# Patient Record
(demographics unavailable — no encounter records)

---

## 2021-06-30 NOTE — [facility]
EXAM: Lumbar spine, 5 views.



HISTORY: Pain.



COMPARISON: None.



FINDINGS: 5 views of the lumbar spine are obtained. There is lumbar dextroscoliosis centered at L3-L4
. There is mild retrolisthesis at multiple levels. There is multilevel endplate remodeling. There is 
facet arthropathy and disc space narrowing predominantly at L4-L5 and L5-S1. There is atherosclerotic
 plaque within the aorta and main aortic branch vessels. There is a suspected ectatic abdominal aorta
. There are cardiac pacemaker defibrillator leads of the superior margin of the field-of-view.



IMPRESSION: 

1. Multilevel degenerative change, primarily at the mid lower lumbar levels.

2. No acute osseous finding.



Electronically signed by: Jessica Byrd MD (6/30/2021 2:42 PM) [id number]

## 2021-07-09 NOTE — RAD
EXAMINATION: US ABDOMEN COMPLETE



INDICATION: 83 years, Female, abnormal liver function test.



COMPARISON: 7/2/2018



TECHNIQUE: Grayscale, color Doppler and limited spectral Doppler images of the abdomen were obtained.




FINDINGS: 



LIVER:

SIZE (LENGTH): 17.3 cm. 

ECHOGENICITY: Increased   

PARENCHYMA: Heterogeneous echotexture. No discrete focal lesion.

INTRAHEPATIC BILE DUCTS: Nondilated.     

PORTAL VEIN: Patent with normal hepatopedal flow.



GALLBLADDER:

GALLBLADDER WALL THICKNESS: 3 mm 

MORPHOLOGY: Normal morphology. No wall hyperemia or pericholecystic free fluid.   

LUMEN: Normal.   



COMMON BILE DUCT DIAMETER: 0.9 cm, similar to prior exam.



RIGHT KIDNEY:

MEASURES: 11.0 cm in length

MORPHOLOGY/PARENCHYMA: Normal corticomedullary differentiation with no shadowing calculus. There is a
 2.0 cm simple appearing cyst in the upper pole, unchanged since prior exam.   

COLLECTING SYSTEM: No hydronephrosis.   



LEFT KIDNEY:

MEASURES: 10.2 cm in length

MORPHOLOGY/PARENCHYMA: Normal corticomedullary differentiation with no shadowing calculus or discrete
 masses.   

COLLECTING SYSTEM: No hydronephrosis.



SPLEEN:

SIZE (LENGTH): 11.3 cm

PARENCHYMA: Unremarkable.   



PANCREAS: 

Obscured by overlying bowel gas. 



OTHER:

RETROPERITONEUM, INFERIOR VENA CAVA: Normal caliber.   

AORTA: Normal caliber measures up to 2.8 cm cm in the midsegment.

FLUID:No free fluid.    



IMPRESSION:

1. Mild to moderate diffuse hepatic steatosis.

2. Dilated proximal common bile duct measures up to 0.9 cm, unchanged since prior exam. No intrahepat
ic biliary ductal dilatation. Recommend further evaluation with total bilirubin level and may conside
r MRCP for evaluation of distal obstruction, as warranted.

3. Ectatic infrarenal abdominal aorta measures up to 2.8 cm, essentially unchanged since prior exam w
ith allowing differences in modality. Abdominal aortic aneurysm measuring 2.6-2.9 cm as above. Recomm
end follow-up ultrasound or CTA in 5 years per ACR and SVS recommendations.

4. Stable 2.0 cm simple right renal cyst.



Electronically signed by: Ayaz Gallegos MD (7/9/2021 8:59 AM) [id number]

## 2022-04-06 NOTE — PDOC1
INITIAL PAIN CONSULT


DATE OF SERVICE:


DOS:


DATE: 4/6/22 


TIME: 14:58





CHIEF COMPLAINT:


Chief Complaint:


Low back and right lower extremity pain





HISTORY OF PRESENT ILLNESS:


84-year-old male presents history of pain low back and right lower extremity for

about 2 years increasing with time not result of any specific injury or accident

that he is aware but getting worse over time cannot stand for very long more t

smith about 5 minutes patient reports is worse with standing and a still position 

such as at a desk or countertop patient reports is better with walking and 

moving around but still significantly painful after about 15 to 20 minutes he 

has to sit and rest with the walking patient reports in the low back rating the 

right lower extremity right foot leg in the back posterior gluteus posterior 

lateral thigh lateral anterior thigh anterior medial thigh and medial and 

posterior calf as well patient reports is constant sharp in the back 

intermittent intensity and radiating in the right leg with shooting cramping 

pain can be burning and hot and perception as well.  Patient reports a 

disability rating 0-10 10 being the worst is a 7 family home responsibilities 

and social activity 2 with self-care and life support activities specially 

sleeping.  Patient wears a CPAP machine at night as well which makes sleep even 

more difficult when he is uncomfortable has to change positions frequently.  

Patient reports it does affect his ability to walk significantly does not affect

his bowel bladder control however he usually goes to [facility] daily to walk is lo

ng as he can tolerate it and uses a shopping cart to lean on which does help.  

Patient does not use any other assistive devices canes or walkers normally.  

Patient has had physical therapy which he felt made the pain worse he is taking 

over-the-counter extra strength Tylenol as well as the melatonin to help him 

sleep which does help.  Patient reports no loss of motor function but s

ignificant fatigability the right lower extremity compared to the left.  Patient

reports no bowel or bladder incontinence.





PAST MEDICAL HISTORY:


PMH:


Hearing loss, arthritis, peripheral vascular disease, hypertension, ventricular 

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





PREVIOUS SURGERIES:


Past Surgical Hx:


Enucleation left eye, prostate surgery, coronary artery stent placements, 

pacemaker placement, defibrillator placement, watchman device placement





CURRENT MEDICATIONS:


Current Meds:





Active Scripts








 Medications  Dose


 Route/Sig


 Max Daily Dose Days Date Category


 


 Pennsaid


  (Diclofenac


 Sodium) 112 Gm


 Sol.md.  2 Alan


 TP BID PRN


  30 4/6/22 Reported


 


 Vitamin B-12


  (Cyanocobalamin


  (Vitamin B-12))


 500 Mcg Tablet  1 Tab


 PO DAILY


  30 4/6/22 Reported


 


 Acetaminophen 325


 Mg Tablet  1 Tab


 PO PRN DAILY PRN


  30 4/6/22 Reported


 


 Ocean (Sodium


 Chloride) 104 Ml


 Spray  104 Ml


 NS PRN PRN


   4/6/22 Reported


 


 Lyrica


  (Pregabalin) 50


 Mg Capsule  1 Cap


 PO BID


   4/6/22 Reported


 


 Namenda


  (Memantine Hcl)


 10 Mg Tablet  1 Tab


 PO BID


   4/6/22 Reported


 


 Aricept


  (Donepezil Hcl)


 10 Mg Tablet  1 Tab


 PO QHS


  30 4/6/22 Reported


 


 Diphenhydramine


 Hcl 25 Mg Tablet  25 Mg


 PO HS PRN


   4/6/22 Reported


 


 Questran Packet


  (Cholestyramine


  (With Sugar)) 4


 Gm Powd.pack  4 Gm


 PO PRN PRN


   4/6/22 Reported


 


 Zyrtec


  (Cetirizine Hcl)


 10 Mg Tablet  1 Tab


 PO DAILY


   4/6/22 Reported


 


 Atorvastatin


 Calcium 20 Mg


 Tablet  1 Tab


 PO DAILY


   4/6/22 Reported


 


 Toprol Xl


  (Metoprolol


 Succinate) 25 Mg


 Tab.er.24h  12.5 Mg


 PO DAILY


   4/6/22 Reported


 


 Eliquis


  (Apixaban) 5 Mg


 Tablet  5 Mg


 PO BID


   11/26/19 Reported


 


 Colace (Docusate


 Sodium) 100 Mg


 Capsule  1 Cap


 PO BID


  30 11/26/19 Reported


 


 Flonase Allergy


 Relief


  (Fluticasone


 Propionate) 9.9


 Ml Spray.susp  2 Sprays


 NS DAILY


   11/26/19 Reported


 


 Ferrous Sulfate


 325 Mg Tablet  1 Tab


 PO DAILY


   11/26/19 Reported


 


 Centrum Silver


 Chewable Tablet


  (Folic


 Acid/Multivits-Min/Lut)


 1 Each Tab.chew  1 Each


 PO ONCE


   11/26/19 Reported


 


 [melatonin]    10 Mg


 PO QHS


   5/24/18 Reported











ALLERGIES;


Allergies:  


Coded Allergies:  


     Milk Containing Products (Verified  Allergy, Intermediate, 11/25/19)


     Sulfa (Sulfonamide Antibiotics) (Verified  Allergy, Mild, 11/26/19)





FAMILY HISTORY:


Family Hx:


Heart disease, diabetes





SOCIAL HISTORY:


Social Hx:


He drinks alcohol about once a month, does not smoke says any illegal illicit or

 recreational drugs is  lives with his spouse and his stepdaughter 

patient is currently retired and lives locally in [facility]





REVIEW OF SYSTEMS:


ROS:


Positive for those items mentioned in history of present illness, all systems 

are reviewed, otherwise negative ,and are complete full and well-documented on 

patient's chart.





PHYSICAL EXAM:


VS:


Blood pressure is 147/76 pulse 88 respirations 16 temperature 97.6 F height is 

5 feet 6 inches weight is 155 pounds.


PE:


PHYSICAL EXAMINATION:





GENERAL: The patient is awake, alert, oriented, appropriate, very pleasant in 

demeanor


HEENT: Shows normocephalic, with prosthetic eye on the left.  Oral cavity: 

Mucous membranes moist and pink.  


NECK: Shows anterior throat supple without palpable lymphadenopathy noted.  

Swallow reflex symmetrical.


CHEST: Shows normal on inspection.  Breath sounds are clear bilaterally, distant

 and coarse but no rales rhonchi or wheezes auscultated bilaterally.


HEART: Shows S1, S2 clear.  No murmurs auscultated.


ABDOMEN: Soft, nontender, nondistended.  No palpable organomegaly is noted.


BACK: Shows spine grossly in the midline.  Normal-appearing cervical lordotic 

curvature.  There is mildly increased thoracic kyphosis, some mild flattening of

 the lumbar lordotic curvature.  Lumbar paraspinous muscles show symmetrical on 

inspection, on palpation shows some moderate tenderness diffusely throughout the

 upper, middle and lower distribution of the paraspinous muscles bilaterally and

 also into the lower thoracic paraspinous musculature, firm and tender, but 

without specific trigger points, without radiation of pain.  The patient has 

good rotational motion of the lumbar spine, both laterally as well as extension 

and flexion without significant difficulty.  No tenderness over the spinous 

processes, sacrum or sacroiliac regions.


EXTREMITIES: Lower extremities show deep tendon reflexes 1+ in the patellar and 

tendo calcaneus tendons.  Motor exam is positive on a scale of 5 with right 

dorsiflexion, extension, quadriceps and hamstring flexion and 5/5 on the left.  

Peripheral pulses are 1+ posterior tibial.  No peripheral edema is noted 

bilaterally.  Lower extremities are warm and dry to touch, equal in color and 

appearance.  Straight leg raise noted to be positive on the right about 40-45 

degrees, left side is negative.  Gaenslen's and Erick's maneuvers are negative

 bilaterally.  The patient is able to stand, has difficulty trying to stand on 

his toes as he loses balance putting all his weight on his right leg.  Patient 

is ambulating with a significant favoring gait favoring the right lower 

extremity not use any assistive devices on his visit today.


SKIN: Shows warm and dry, good turgor.  No edema.  No sores, rashes or bruising 

throughout.





IMPRESSION:


Impression:


84-year-old male with long history proximate 2 years increasing low back right 

lower extremity pain and radicular fashion.


Plain films lumbar spine with multilevel endplate remodeling facet arthropathy 

and disc space narrowing predominate L4-5 and L5-S1


Arthritis


Hypertension


Peripheral vascular disease


Diabetes


COPD





Plan: Options were discussed with the patient including conservative managements

 physical therapies and interventional techniques.  Patient would like to pursue

 interventional techniques.  We discussed a lumbar epidural steroid injection 

using description as well as anatomical models to describe the procedure.  We 

will first check with patient's cardiologist for clearance regarding patient's 

Eliquis and if deemed safe and appropriate to hold this for 3 days prior to 

return we will have him hold this and return for lumbar epidural steroid 

injection at that time.  In the meantime, patient will continue with stretching 

strength exercises as well as actively walking daily as tolerated.











FER HOPE MD                Apr 6, 2022 15:07

## 2022-04-12 NOTE — PDOC4
Procedure Note:


ICD 10 Code:


ICD 10 Code:


M54.16


M51.36


M48.06





Procedure Note:


Patient was consented for lumbar epidural steroid injection fluoroscopic 

guidance.  Risks were discussed including but not limited to: Bleeding, 

infection, possibility of epidural hematoma and subsequent neurological 

compromise, dural puncture, headaches, spinal cord and/or nerve damage, side 

effects of steroid medication, and poor results regarding pain control.  Patient

understands and wished to proceed.


Procedure is lumbar epidural steroid injection under local anesthetic using 

sterile prep and drape at the L4-5 level using C-arm fluoroscopic guidance in 

both AP and lateral views medications injected is 120 mg methylprednisolone 

+10mL preservative-free normal saline and 2 mL contrast- condition at discharge 

is stable patient tolerated procedure well had no complications.











FER HOPE MD               Apr 12, 2022 14:43

## 2022-04-12 NOTE — PDOC
Progress Note - Pain Clinic


Date of Service:


DOS:


DATE: 4/12/22 


TIME: 14:40





Diagnosis:


Dx:


Lumbar radiculopathy with lumbar degenerative disc disease and lumbar spinal 

stenosis





History or Present Illness:


HPI:


84-year-old male returns for follow-up status post initial valuation clearance 

to hold his Eliquis he is in office for 3 days now and reports still significant

pain low back and right lower extremity posterior gluteus posterior lateral 

thigh lateral anterior thigh anteromedial thigh medial lower leg into the calf 

as well posteriorly on the right side only.  Patient reports it feels cold on 

the right side also aching and dull painful aching in the back as well and sharp

pain in the leg.  Patient rates his pain is 8 on scale 10 is worse over the past

week 6 on average to its least and is a 6 today.  Patient reports no bowel or 

bladder incontinence no loss of motor function with significant fatigability of 

the right leg with ambulation.  Patient reports is better with sitting or lying 

down generally does not awaken her from sleep.





Physical Exam:


VS:


Blood pressure is 132/64 pulse 60 respirations 16 temperature 97.9 F weight is 

155 pounds.


PE:


PHYSICAL EXAMINATION:





GENERAL: The patient is awake, alert, oriented, appropriate, very pleasant in 

demeanor


HEENT: Shows normocephalic, atraumatic.  Extraocular movements are intact and 

symmetrical.  Oral cavity: Mucous membranes moist and pink.  


NECK: Shows anterior throat supple without palpable lymphadenopathy noted.  Swal

low reflex symmetrical.


CHEST: Shows normal on inspection.  Breath sounds are clear bilaterally, no 

rales or rhonchi auscultated.


HEART: Shows S1, S2 clear.  No murmurs auscultated.


ABDOMEN: Soft, nontender, nondistended.  No palpable organomegaly is noted. 


BACK: Shows spine grossly in the midline.  Normal-appearing cervical lordotic 

curvature.  There is slightly increased thoracic kyphosis, some minor flattening

of the lumbar lordotic curvature.  Lumbar paraspinous muscles show symmetrical 

on inspection, on palpation shows some moderate tenderness diffusely throughout 

the upper, middle and lower distribution the paraspinous musculature, but 

without specific trigger points, without radiation of pain.  The patient has 

good rotational motion of the lumbar spine, both laterally as well as extension 

and flexion without significant difficulty.


EXTREMITIES: Lower extremities show deep tendon reflexes 1+ in the patellar and 

tendo calcaneus tendons.  Motor exam is 4 on a scale of 5 with right 

dorsiflexion, extension, quadriceps and hamstring flexion and 5/5 on the left.  

Peripheral pulses are 1+ posterior tibial.  No peripheral edema is noted 

bilaterally.  Lower extremities are warm and dry.


SKIN: Shows warm and dry, good turgor.  No edema.  No sores, rashes or bruising 

throughout.





Procedure:


Procedure:


Options discussed with patient.  Patient's old chart was reviewed his current 

medication regimen updated current review of systems updated today as well.  We 

will proceed with a lumbar epidural steroid injection today with fluoroscopic 

guidance.  Risks were discussed including but not limited to: Bleeding, infec

tion, possibility of epidural hematoma and subsequent neurological compromise, 

dural puncture, headaches, spinal cord and/or nerve damage, side effects of 

steroid medication, and poor results regarding pain control.  Patient 

understands and wished to proceed.  Patient will return to clinic in 

approximately 2 weeks for follow-up, was counseled as to return appointment, 

activity level, and side effects to be aware of.





Medication Injected:


Med Injected:


Procedure is lumbar epidural steroid injection under local anesthetic using 

sterile prep and drape at the L4-5 level using C-arm fluoroscopic guidance in 

both AP and lateral views medications injected is 120 mg methylprednisolone 

+10mL preservative-free normal saline and 2 mL contrast- condition at discharge 

is stable patient tolerated procedure well had no complications.





Condition at Discharge:


Condition at Discharge:


Condition at discharge stable, patient Pablo the procedure well and had no comp

lications.











FER HOPE MD               Apr 12, 2022 14:43

## 2022-05-03 NOTE — PDOC
Progress Note - Pain Clinic


Date of Service:


DOS:


DATE: 5/3/22 


TIME: 13:56





Diagnosis:


Dx:


Lumbar radiculopathy with lumbar degenerative disease and lumbar spinal stenosis





History or Present Illness:


HPI:


84-year-old male returns for follow-up status post lumbar epidural steroid 

injection x1.  Patient reports near Hund percent improvement in the back and 

right lower extremity pain patient reports he is increase his activity since 

about 1 to 2 days after his injection with almost no pain patient reports some 

mild pain in the low back only but no more radiation into the right lower 

extremity patient rates as a 2 on scale 10 is worse over the past week 1 on 

average 0 at its least, and is a 1 today.  Patient reports no loss of motor 

function no bowel or bladder incontinence reports his pain is dull in the back 

on and off in intensity he is increase his activity with greater distance 

walking doing household activities travel with greater ease and comfort sleeping

better at night does not awaken her from sleep.  Patient reports no bowel or 

bladder incontinence no loss of motor function no significant fatigability.  

Patient is very pleased with his progress thus far reports it is still helping 

and has no bowel or bladder incontinence patient





Physical Exam:


VS:


Blood pressure is 120/65 pulse 57 respirations 18 temperature is 97.7 F weight 

is 165 pounds.


PE:


PHYSICAL EXAMINATION:





GENERAL: The patient is awake, alert, oriented, appropriate, very pleasant in 

demeanor


HEENT: Shows normocephalic, atraumatic.  Extraocular movements are intact and 

symmetrical, discoloration of the left eye.  Oral cavity: Mucous membranes moist

and pink. 


NECK: Shows anterior throat supple without palpable lymphadenopathy noted.  

Swallow reflex symmetrical.


CHEST: Shows normal on inspection.  Breath sounds are clear bilaterally, distant

but no rales rhonchi or wheezes auscultated.


HEART: Shows S1, S2 clear.  No murmurs auscultated.


ABDOMEN: Soft, nontender, nondistended.  No palpable organomegaly is noted.  No 

rebound or guarding demonstrated.


BACK: Shows spine grossly in the midline.  Normal-appearing cervical lordotic 

curvature.  There is slightly increased thoracic kyphosis, some minor flattening

of the lumbar lordotic curvature.  Lumbar paraspinous muscles show symmetrical 

on inspection, on palpation shows some moderate tenderness diffusely throughout 

the upper, middle and lower distribution of the paraspinous muscles without 

specific trigger points, without radiation of pain.  The patient has good rot

ational motion of the lumbar spine, both laterally as well as extension and 

flexion without significant difficulty.


EXTREMITIES: Lower extremities show deep tendon reflexes 1+ in the patellar and 

tendo calcaneus tendons.  Motor exam is 4 on a scale of 5 with right 

dorsiflexion, extension, quadriceps and hamstring flexion and 5/5 on the left.  

Peripheral pulses are 1+ posterior tibial.  No peripheral edema is noted 

bilaterally.  Lower extremities are warm and dry.


SKIN: Shows warm and dry, good turgor.  No edema.  No sores, rashes or bruising 

throughout.





Procedure:


Procedure:


Options discussed with patient.  Patient's old chart was reviewed his current 

medication regimen updated current review of systems updated today as well.  As 

patient is doing quite a better we will hold on any further injections at this 

time.  Patient was encouraged increase activity as tolerated caution with 

extensive lifting and standing and bending stooping activities.  Patient 

understands and will follow up at this time on as-needed basis.





Medication Injected:


Med Injected:


None





Condition at Discharge:


Condition at Discharge:


Condition at discharge is stable.











FER HOPE MD                May 3, 2022 13:59